# Patient Record
Sex: MALE | Race: BLACK OR AFRICAN AMERICAN | NOT HISPANIC OR LATINO | Employment: STUDENT | URBAN - METROPOLITAN AREA
[De-identification: names, ages, dates, MRNs, and addresses within clinical notes are randomized per-mention and may not be internally consistent; named-entity substitution may affect disease eponyms.]

---
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
## 2023-09-10 ENCOUNTER — HOSPITAL ENCOUNTER (EMERGENCY)
Facility: HOSPITAL | Age: 18
Discharge: HOME | End: 2023-09-10
Attending: FAMILY MEDICINE
Payer: MEDICAID

## 2023-09-10 VITALS
OXYGEN SATURATION: 98 % | HEIGHT: 75 IN | BODY MASS INDEX: 34.82 KG/M2 | TEMPERATURE: 99 F | RESPIRATION RATE: 16 BRPM | WEIGHT: 280 LBS | DIASTOLIC BLOOD PRESSURE: 64 MMHG | HEART RATE: 85 BPM | SYSTOLIC BLOOD PRESSURE: 143 MMHG

## 2023-09-10 DIAGNOSIS — A08.4 VIRAL GASTROENTERITIS: Primary | ICD-10-CM

## 2023-09-10 LAB
ALBUMIN SERPL BCP-MCNC: 4.4 G/DL (ref 3.5–5.2)
ALP SERPL-CCNC: 60 U/L (ref 38–126)
ALT SERPL W/O P-5'-P-CCNC: 58 U/L (ref 10–44)
ANION GAP SERPL CALC-SCNC: 10 MMOL/L (ref 8–16)
AST SERPL-CCNC: 47 U/L (ref 15–46)
BASOPHILS # BLD AUTO: 0.02 K/UL (ref 0–0.2)
BASOPHILS NFR BLD: 0.2 % (ref 0–1.9)
BILIRUB SERPL-MCNC: 0.4 MG/DL (ref 0.1–1)
CALCIUM SERPL-MCNC: 8.8 MG/DL (ref 8.7–10.5)
CHLORIDE SERPL-SCNC: 102 MMOL/L (ref 95–110)
CO2 SERPL-SCNC: 28 MMOL/L (ref 23–29)
CREAT SERPL-MCNC: 1.27 MG/DL (ref 0.5–1.4)
DIFFERENTIAL METHOD: ABNORMAL
EOSINOPHIL # BLD AUTO: 0.1 K/UL (ref 0–0.5)
EOSINOPHIL NFR BLD: 1.2 % (ref 0–8)
ERYTHROCYTE [DISTWIDTH] IN BLOOD BY AUTOMATED COUNT: 11.9 % (ref 11.5–14.5)
EST. GFR  (NO RACE VARIABLE): >60 ML/MIN/1.73 M^2
GLUCOSE SERPL-MCNC: 99 MG/DL (ref 70–110)
HCT VFR BLD AUTO: 46.2 % (ref 40–54)
HGB BLD-MCNC: 15.2 G/DL (ref 14–18)
IMM GRANULOCYTES # BLD AUTO: 0.02 K/UL (ref 0–0.04)
IMM GRANULOCYTES NFR BLD AUTO: 0.2 % (ref 0–0.5)
LYMPHOCYTES # BLD AUTO: 1.4 K/UL (ref 1–4.8)
LYMPHOCYTES NFR BLD: 16.5 % (ref 18–48)
MCH RBC QN AUTO: 27.9 PG (ref 27–31)
MCHC RBC AUTO-ENTMCNC: 32.9 G/DL (ref 32–36)
MCV RBC AUTO: 85 FL (ref 82–98)
MONOCYTES # BLD AUTO: 1 K/UL (ref 0.3–1)
MONOCYTES NFR BLD: 11.6 % (ref 4–15)
NEUTROPHILS # BLD AUTO: 6.1 K/UL (ref 1.8–7.7)
NEUTROPHILS NFR BLD: 70.3 % (ref 38–73)
NRBC BLD-RTO: 0 /100 WBC
PLATELET # BLD AUTO: 207 K/UL (ref 150–450)
PMV BLD AUTO: 11.5 FL (ref 9.2–12.9)
POTASSIUM SERPL-SCNC: 3.8 MMOL/L (ref 3.5–5.1)
PROT SERPL-MCNC: 7.8 G/DL (ref 6–8.4)
RBC # BLD AUTO: 5.44 M/UL (ref 4.6–6.2)
SARS-COV-2 RDRP RESP QL NAA+PROBE: NEGATIVE
SODIUM SERPL-SCNC: 140 MMOL/L (ref 136–145)
UUN UR-MCNC: 16 MG/DL (ref 2–20)
WBC # BLD AUTO: 8.67 K/UL (ref 3.9–12.7)

## 2023-09-10 PROCEDURE — 80053 COMPREHEN METABOLIC PANEL: CPT | Mod: ER | Performed by: FAMILY MEDICINE

## 2023-09-10 PROCEDURE — 25000003 PHARM REV CODE 250: Mod: ER | Performed by: FAMILY MEDICINE

## 2023-09-10 PROCEDURE — U0002 COVID-19 LAB TEST NON-CDC: HCPCS | Mod: ER | Performed by: FAMILY MEDICINE

## 2023-09-10 PROCEDURE — 96374 THER/PROPH/DIAG INJ IV PUSH: CPT | Mod: ER

## 2023-09-10 PROCEDURE — 96361 HYDRATE IV INFUSION ADD-ON: CPT | Mod: ER

## 2023-09-10 PROCEDURE — 63600175 PHARM REV CODE 636 W HCPCS: Mod: ER | Performed by: FAMILY MEDICINE

## 2023-09-10 PROCEDURE — 96375 TX/PRO/DX INJ NEW DRUG ADDON: CPT | Mod: ER

## 2023-09-10 PROCEDURE — 85025 COMPLETE CBC W/AUTO DIFF WBC: CPT | Mod: ER | Performed by: FAMILY MEDICINE

## 2023-09-10 PROCEDURE — 99284 EMERGENCY DEPT VISIT MOD MDM: CPT | Mod: 25,ER

## 2023-09-10 RX ORDER — ONDANSETRON 4 MG/1
4 TABLET, ORALLY DISINTEGRATING ORAL EVERY 8 HOURS PRN
Qty: 20 TABLET | Refills: 0 | Status: SHIPPED | OUTPATIENT
Start: 2023-09-10 | End: 2024-01-30

## 2023-09-10 RX ORDER — FAMOTIDINE 10 MG/ML
20 INJECTION INTRAVENOUS
Status: COMPLETED | OUTPATIENT
Start: 2023-09-10 | End: 2023-09-10

## 2023-09-10 RX ORDER — LOPERAMIDE HYDROCHLORIDE 2 MG/1
4 CAPSULE ORAL
Status: COMPLETED | OUTPATIENT
Start: 2023-09-10 | End: 2023-09-10

## 2023-09-10 RX ORDER — ONDANSETRON 2 MG/ML
4 INJECTION INTRAMUSCULAR; INTRAVENOUS
Status: COMPLETED | OUTPATIENT
Start: 2023-09-10 | End: 2023-09-10

## 2023-09-10 RX ORDER — DICYCLOMINE HYDROCHLORIDE 10 MG/1
20 CAPSULE ORAL
Status: COMPLETED | OUTPATIENT
Start: 2023-09-10 | End: 2023-09-10

## 2023-09-10 RX ORDER — FAMOTIDINE 20 MG/1
20 TABLET, FILM COATED ORAL 2 TIMES DAILY
Qty: 30 TABLET | Refills: 0 | Status: SHIPPED | OUTPATIENT
Start: 2023-09-10 | End: 2024-01-30

## 2023-09-10 RX ORDER — LOPERAMIDE HYDROCHLORIDE 2 MG/1
2 CAPSULE ORAL 4 TIMES DAILY PRN
Qty: 15 CAPSULE | Refills: 0 | Status: SHIPPED | OUTPATIENT
Start: 2023-09-10 | End: 2024-01-30

## 2023-09-10 RX ORDER — KETOROLAC TROMETHAMINE 30 MG/ML
15 INJECTION, SOLUTION INTRAMUSCULAR; INTRAVENOUS
Status: COMPLETED | OUTPATIENT
Start: 2023-09-10 | End: 2023-09-10

## 2023-09-10 RX ORDER — DICYCLOMINE HYDROCHLORIDE 20 MG/1
20 TABLET ORAL 3 TIMES DAILY PRN
Qty: 30 TABLET | Refills: 0 | Status: SHIPPED | OUTPATIENT
Start: 2023-09-10 | End: 2024-01-30

## 2023-09-10 RX ADMIN — LOPERAMIDE HYDROCHLORIDE 4 MG: 2 CAPSULE ORAL at 07:09

## 2023-09-10 RX ADMIN — DICYCLOMINE HYDROCHLORIDE 20 MG: 10 CAPSULE ORAL at 07:09

## 2023-09-10 RX ADMIN — KETOROLAC TROMETHAMINE 15 MG: 30 INJECTION, SOLUTION INTRAMUSCULAR; INTRAVENOUS at 06:09

## 2023-09-10 RX ADMIN — FAMOTIDINE 20 MG: 10 INJECTION, SOLUTION INTRAVENOUS at 06:09

## 2023-09-10 RX ADMIN — SODIUM CHLORIDE 1000 ML: 9 INJECTION, SOLUTION INTRAVENOUS at 06:09

## 2023-09-10 RX ADMIN — ONDANSETRON 4 MG: 2 INJECTION INTRAMUSCULAR; INTRAVENOUS at 06:09

## 2023-09-10 NOTE — Clinical Note
Hayde Vargas accompanied their family member to the emergency department on 9/10/2023. They may return to school on 09/11/2023.      If you have any questions or concerns, please don't hesitate to call.      Zari Carson RN RN

## 2023-09-10 NOTE — ED PROVIDER NOTES
Encounter Date: 9/10/2023       History     Chief Complaint   Patient presents with    Abdominal Pain    Diarrhea     X 4 days     18-year-old male complains of diarrhea for last 4 days.  No blood in stool.  Able to tolerate orally.  Diffuse cramps in belly.  Denies having fever.  No nasal congestion or sore throat.    The history is provided by the patient.     Review of patient's allergies indicates:  No Known Allergies  Past Medical History:   Diagnosis Date    Asthma     Eczema      History reviewed. No pertinent surgical history.  History reviewed. No pertinent family history.  Social History     Tobacco Use    Smoking status: Never    Smokeless tobacco: Never   Substance Use Topics    Alcohol use: Never    Drug use: Never     Review of Systems   Gastrointestinal:  Positive for diarrhea.   All other systems reviewed and are negative.      Physical Exam     Initial Vitals [09/10/23 0616]   BP Pulse Resp Temp SpO2   (!) 157/70 98 20 98.5 °F (36.9 °C) 96 %      MAP       --         Physical Exam    Nursing note and vitals reviewed.  Constitutional: Vital signs are normal. He appears well-developed and well-nourished. He is active. No distress.   HENT:   Head: Normocephalic.   Nose: Nose normal.   Mouth/Throat: Oropharynx is clear and moist and mucous membranes are normal.   Eyes: Conjunctivae, EOM and lids are normal.   Neck: Neck supple.   Normal range of motion.  Cardiovascular:  Normal rate, regular rhythm, S1 normal, S2 normal and normal heart sounds.           Pulmonary/Chest: Breath sounds normal. No respiratory distress. He has no wheezes. He has no rales.   Abdominal: Abdomen is soft. Bowel sounds are normal. He exhibits no distension. There is no abdominal tenderness. There is no rebound.   Musculoskeletal:         General: Normal range of motion.      Right upper arm: Normal.      Left upper arm: Normal.      Cervical back: Normal range of motion and neck supple.      Right lower leg: Normal.      Left  lower leg: Normal.     Neurological: He is alert and oriented to person, place, and time. He has normal strength. GCS score is 15. GCS eye subscore is 4. GCS verbal subscore is 5. GCS motor subscore is 6.   Skin: Skin is warm. Capillary refill takes less than 2 seconds.   Psychiatric: He has a normal mood and affect. His speech is normal and behavior is normal. Thought content normal. Cognition and memory are normal.         ED Course   Procedures  Labs Reviewed   CBC W/ AUTO DIFFERENTIAL - Abnormal; Notable for the following components:       Result Value    Lymph % 16.5 (*)     All other components within normal limits   COMPREHENSIVE METABOLIC PANEL - Abnormal; Notable for the following components:    AST 47 (*)     ALT 58 (*)     All other components within normal limits   SARS-COV-2 RNA AMPLIFICATION, QUAL    Narrative:     Is the patient symptomatic?->Yes   URINALYSIS, REFLEX TO URINE CULTURE          Imaging Results    None          Medications   sodium chloride 0.9% bolus 1,000 mL 1,000 mL (1,000 mLs Intravenous New Bag 9/10/23 0632)   loperamide capsule 4 mg (has no administration in time range)   dicyclomine capsule 20 mg (has no administration in time range)   ondansetron injection 4 mg (4 mg Intravenous Given 9/10/23 0632)   famotidine (PF) injection 20 mg (20 mg Intravenous Given 9/10/23 0633)   ketorolac injection 15 mg (15 mg Intravenous Given 9/10/23 0632)     Medical Decision Making  Diarrhea for last 4 days.  Not taken any medication.  Able to tolerate orally.  Diffuse abdominal cramps.  Abdomen is soft nondistended.  Increased bowel sounds.  Oral mucosa slightly dry.    Differential diagnosis includes:- viral gastroenteritis, bacterial gastroenteritis, food toxin, dehydration, irritable bowel syndrome,    Patient given IV fluids, Zofran, Toradol, Bentyl and Imodium in ED.  Elevated liver enzymes otherwise normal white cell count.  Patient tolerating orally.  Advised for adequate hydration.   Prescribed Zofran, Bentyl and Pepcid.  ED with any worsening symptoms.      Amount and/or Complexity of Data Reviewed  Labs: ordered. Decision-making details documented in ED Course.     Details: Normal white cell count.  BUN and creatinine within normal range.  Slightly elevated AST and ALT.  Probably secondary to viral gastroenteritis.                               Clinical Impression:   Final diagnoses:  [A08.4] Viral gastroenteritis (Primary)        ED Disposition Condition    Discharge Stable          ED Prescriptions       Medication Sig Dispense Start Date End Date Auth. Provider    ondansetron (ZOFRAN-ODT) 4 MG TbDL Take 1 tablet (4 mg total) by mouth every 8 (eight) hours as needed (nausea or vomiting). 20 tablet 9/10/2023 -- Yusuf Heath MD    famotidine (PEPCID) 20 MG tablet Take 1 tablet (20 mg total) by mouth 2 (two) times daily. 30 tablet 9/10/2023 -- Yusuf Heath MD    loperamide (IMODIUM) 2 mg capsule Take 1 capsule (2 mg total) by mouth 4 (four) times daily as needed for Diarrhea. 15 capsule 9/10/2023 -- Yusuf Heath MD    dicyclomine (BENTYL) 20 mg tablet Take 1 tablet (20 mg total) by mouth 3 (three) times daily as needed (abdominal pain). 30 tablet 9/10/2023 -- Yusuf Heath MD          Follow-up Information       Follow up With Specialties Details Why Contact Info    Primarycare physician  In 2 days F/U              Yusuf Heath MD  09/10/23 9244

## 2023-09-10 NOTE — Clinical Note
"Mike Bartoneliud Aguillon was seen and treated in our emergency department on 9/10/2023.  He may return to work on 09/12/2023.  Pt sick since 9/8     If you have any questions or concerns, please don't hesitate to call.      Yusuf Heath MD"

## 2024-01-30 ENCOUNTER — HOSPITAL ENCOUNTER (OUTPATIENT)
Facility: HOSPITAL | Age: 19
Discharge: HOME | End: 2024-01-31
Attending: EMERGENCY MEDICINE | Admitting: INTERNAL MEDICINE
Payer: MEDICAID

## 2024-01-30 DIAGNOSIS — R06.2 WHEEZING: ICD-10-CM

## 2024-01-30 DIAGNOSIS — J45.901 MODERATE ASTHMA WITH EXACERBATION, UNSPECIFIED WHETHER PERSISTENT: Primary | ICD-10-CM

## 2024-01-30 DIAGNOSIS — J45.901 REACTIVE AIRWAY DISEASE WITH ACUTE EXACERBATION, UNSPECIFIED ASTHMA SEVERITY, UNSPECIFIED WHETHER PERSISTENT: ICD-10-CM

## 2024-01-30 DIAGNOSIS — J45.909 CHILDHOOD ASTHMA, UNSPECIFIED ASTHMA SEVERITY, UNSPECIFIED WHETHER COMPLICATED, UNSPECIFIED WHETHER PERSISTENT: ICD-10-CM

## 2024-01-30 DIAGNOSIS — R06.02 SOB (SHORTNESS OF BREATH): ICD-10-CM

## 2024-01-30 DIAGNOSIS — R74.01 TRANSAMINITIS: ICD-10-CM

## 2024-01-30 DIAGNOSIS — L30.9 ECZEMA, UNSPECIFIED TYPE: ICD-10-CM

## 2024-01-30 LAB
ALBUMIN SERPL BCP-MCNC: 4.3 G/DL (ref 3.5–5.2)
ALP SERPL-CCNC: 65 U/L (ref 38–126)
ALT SERPL W/O P-5'-P-CCNC: 61 U/L (ref 10–44)
ANION GAP SERPL CALC-SCNC: 7 MMOL/L (ref 8–16)
AST SERPL-CCNC: 55 U/L (ref 15–46)
BASOPHILS # BLD AUTO: 0.03 K/UL (ref 0–0.2)
BASOPHILS NFR BLD: 0.2 % (ref 0–1.9)
BILIRUB SERPL-MCNC: 0.6 MG/DL (ref 0.1–1)
CALCIUM SERPL-MCNC: 9.6 MG/DL (ref 8.7–10.5)
CHLORIDE SERPL-SCNC: 103 MMOL/L (ref 95–110)
CO2 SERPL-SCNC: 29 MMOL/L (ref 23–29)
CREAT SERPL-MCNC: 1.16 MG/DL (ref 0.5–1.4)
D DIMER PPP IA.FEU-MCNC: 0.36 MG/L FEU
DIFFERENTIAL METHOD BLD: ABNORMAL
EOSINOPHIL # BLD AUTO: 0.2 K/UL (ref 0–0.5)
EOSINOPHIL NFR BLD: 1.8 % (ref 0–8)
ERYTHROCYTE [DISTWIDTH] IN BLOOD BY AUTOMATED COUNT: 11.9 % (ref 11.5–14.5)
EST. GFR  (NO RACE VARIABLE): >60 ML/MIN/1.73 M^2
GLUCOSE SERPL-MCNC: 108 MG/DL (ref 70–110)
HCT VFR BLD AUTO: 43.9 % (ref 40–54)
HGB BLD-MCNC: 14.5 G/DL (ref 14–18)
IMM GRANULOCYTES # BLD AUTO: 0.05 K/UL (ref 0–0.04)
IMM GRANULOCYTES NFR BLD AUTO: 0.4 % (ref 0–0.5)
INFLUENZA A, MOLECULAR: NEGATIVE
INFLUENZA B, MOLECULAR: NEGATIVE
LYMPHOCYTES # BLD AUTO: 2.4 K/UL (ref 1–4.8)
LYMPHOCYTES NFR BLD: 17.9 % (ref 18–48)
MCH RBC QN AUTO: 27.7 PG (ref 27–31)
MCHC RBC AUTO-ENTMCNC: 33 G/DL (ref 32–36)
MCV RBC AUTO: 84 FL (ref 82–98)
MONOCYTES # BLD AUTO: 1.1 K/UL (ref 0.3–1)
MONOCYTES NFR BLD: 8 % (ref 4–15)
NEUTROPHILS # BLD AUTO: 9.7 K/UL (ref 1.8–7.7)
NEUTROPHILS NFR BLD: 71.7 % (ref 38–73)
NRBC BLD-RTO: 0 /100 WBC
NT-PROBNP SERPL-MCNC: <20 PG/ML (ref 5–450)
PLATELET # BLD AUTO: 213 K/UL (ref 150–450)
PMV BLD AUTO: 11.2 FL (ref 9.2–12.9)
POTASSIUM SERPL-SCNC: 3.9 MMOL/L (ref 3.5–5.1)
PROT SERPL-MCNC: 7.7 G/DL (ref 6–8.4)
RBC # BLD AUTO: 5.24 M/UL (ref 4.6–6.2)
SARS-COV-2 RDRP RESP QL NAA+PROBE: NEGATIVE
SODIUM SERPL-SCNC: 139 MMOL/L (ref 136–145)
SPECIMEN SOURCE: NORMAL
TROPONIN I SERPL-MCNC: 0.01 NG/ML (ref 0.01–0.03)
UUN UR-MCNC: 18 MG/DL (ref 2–20)
WBC # BLD AUTO: 13.54 K/UL (ref 3.9–12.7)

## 2024-01-30 PROCEDURE — 94640 AIRWAY INHALATION TREATMENT: CPT | Mod: ER,XB

## 2024-01-30 PROCEDURE — 80053 COMPREHEN METABOLIC PANEL: CPT | Mod: ER | Performed by: NURSE PRACTITIONER

## 2024-01-30 PROCEDURE — 25000003 PHARM REV CODE 250: Mod: ER | Performed by: NURSE PRACTITIONER

## 2024-01-30 PROCEDURE — 99900035 HC TECH TIME PER 15 MIN (STAT): Mod: ER

## 2024-01-30 PROCEDURE — 87502 INFLUENZA DNA AMP PROBE: CPT | Mod: ER

## 2024-01-30 PROCEDURE — G0378 HOSPITAL OBSERVATION PER HR: HCPCS | Mod: ER

## 2024-01-30 PROCEDURE — 93005 ELECTROCARDIOGRAM TRACING: CPT | Mod: ER

## 2024-01-30 PROCEDURE — 85025 COMPLETE CBC W/AUTO DIFF WBC: CPT | Mod: ER | Performed by: NURSE PRACTITIONER

## 2024-01-30 PROCEDURE — 93010 ELECTROCARDIOGRAM REPORT: CPT | Mod: ,,, | Performed by: INTERNAL MEDICINE

## 2024-01-30 PROCEDURE — 63600175 PHARM REV CODE 636 W HCPCS: Mod: ER | Performed by: NURSE PRACTITIONER

## 2024-01-30 PROCEDURE — 96374 THER/PROPH/DIAG INJ IV PUSH: CPT | Mod: ER

## 2024-01-30 PROCEDURE — U0002 COVID-19 LAB TEST NON-CDC: HCPCS | Mod: ER | Performed by: NURSE PRACTITIONER

## 2024-01-30 PROCEDURE — 87502 INFLUENZA DNA AMP PROBE: CPT | Mod: ER | Performed by: NURSE PRACTITIONER

## 2024-01-30 PROCEDURE — 84484 ASSAY OF TROPONIN QUANT: CPT | Mod: ER | Performed by: NURSE PRACTITIONER

## 2024-01-30 PROCEDURE — 25000242 PHARM REV CODE 250 ALT 637 W/ HCPCS: Mod: ER | Performed by: NURSE PRACTITIONER

## 2024-01-30 PROCEDURE — 83880 ASSAY OF NATRIURETIC PEPTIDE: CPT | Mod: ER | Performed by: NURSE PRACTITIONER

## 2024-01-30 PROCEDURE — 99285 EMERGENCY DEPT VISIT HI MDM: CPT | Mod: 25,ER

## 2024-01-30 PROCEDURE — 27000221 HC OXYGEN, UP TO 24 HOURS: Mod: ER

## 2024-01-30 PROCEDURE — 85379 FIBRIN DEGRADATION QUANT: CPT | Mod: ER | Performed by: NURSE PRACTITIONER

## 2024-01-30 RX ORDER — PREDNISONE 20 MG/1
40 TABLET ORAL NIGHTLY
Status: DISCONTINUED | OUTPATIENT
Start: 2024-01-31 | End: 2024-01-31 | Stop reason: HOSPADM

## 2024-01-30 RX ORDER — TALC
6 POWDER (GRAM) TOPICAL NIGHTLY PRN
Status: DISCONTINUED | OUTPATIENT
Start: 2024-01-31 | End: 2024-01-31 | Stop reason: HOSPADM

## 2024-01-30 RX ORDER — POLYETHYLENE GLYCOL 3350 17 G/17G
17 POWDER, FOR SOLUTION ORAL DAILY
Status: DISCONTINUED | OUTPATIENT
Start: 2024-01-31 | End: 2024-01-31 | Stop reason: HOSPADM

## 2024-01-30 RX ORDER — ENOXAPARIN SODIUM 100 MG/ML
40 INJECTION SUBCUTANEOUS EVERY 24 HOURS
Status: DISCONTINUED | OUTPATIENT
Start: 2024-01-31 | End: 2024-01-31 | Stop reason: HOSPADM

## 2024-01-30 RX ORDER — SODIUM CHLORIDE 0.9 % (FLUSH) 0.9 %
10 SYRINGE (ML) INJECTION EVERY 12 HOURS PRN
Status: DISCONTINUED | OUTPATIENT
Start: 2024-01-31 | End: 2024-01-31 | Stop reason: HOSPADM

## 2024-01-30 RX ORDER — IPRATROPIUM BROMIDE AND ALBUTEROL SULFATE 2.5; .5 MG/3ML; MG/3ML
3 SOLUTION RESPIRATORY (INHALATION) EVERY 4 HOURS
Status: DISCONTINUED | OUTPATIENT
Start: 2024-01-30 | End: 2024-01-31

## 2024-01-30 RX ORDER — IBUPROFEN 200 MG
24 TABLET ORAL
Status: DISCONTINUED | OUTPATIENT
Start: 2024-01-31 | End: 2024-01-31 | Stop reason: SDUPTHER

## 2024-01-30 RX ORDER — PREDNISONE 20 MG/1
60 TABLET ORAL
Status: COMPLETED | OUTPATIENT
Start: 2024-01-30 | End: 2024-01-30

## 2024-01-30 RX ORDER — IBUPROFEN 200 MG
16 TABLET ORAL
Status: DISCONTINUED | OUTPATIENT
Start: 2024-01-31 | End: 2024-01-31 | Stop reason: SDUPTHER

## 2024-01-30 RX ORDER — NALOXONE HCL 0.4 MG/ML
0.02 VIAL (ML) INJECTION
Status: DISCONTINUED | OUTPATIENT
Start: 2024-01-31 | End: 2024-01-31 | Stop reason: HOSPADM

## 2024-01-30 RX ORDER — IPRATROPIUM BROMIDE AND ALBUTEROL SULFATE 2.5; .5 MG/3ML; MG/3ML
3 SOLUTION RESPIRATORY (INHALATION) EVERY 4 HOURS
Status: DISCONTINUED | OUTPATIENT
Start: 2024-01-30 | End: 2024-01-30

## 2024-01-30 RX ORDER — ACETAMINOPHEN 500 MG
1000 TABLET ORAL
Status: COMPLETED | OUTPATIENT
Start: 2024-01-30 | End: 2024-01-30

## 2024-01-30 RX ORDER — IPRATROPIUM BROMIDE AND ALBUTEROL SULFATE 2.5; .5 MG/3ML; MG/3ML
3 SOLUTION RESPIRATORY (INHALATION) EVERY 4 HOURS
Status: DISCONTINUED | OUTPATIENT
Start: 2024-01-31 | End: 2024-01-30

## 2024-01-30 RX ORDER — DEXAMETHASONE SODIUM PHOSPHATE 4 MG/ML
8 INJECTION, SOLUTION INTRA-ARTICULAR; INTRALESIONAL; INTRAMUSCULAR; INTRAVENOUS; SOFT TISSUE
Status: COMPLETED | OUTPATIENT
Start: 2024-01-30 | End: 2024-01-30

## 2024-01-30 RX ORDER — GLUCAGON 1 MG
1 KIT INJECTION
Status: DISCONTINUED | OUTPATIENT
Start: 2024-01-31 | End: 2024-01-31 | Stop reason: SDUPTHER

## 2024-01-30 RX ORDER — LEVALBUTEROL 1.25 MG/.5ML
1.25 SOLUTION, CONCENTRATE RESPIRATORY (INHALATION)
Status: DISPENSED | OUTPATIENT
Start: 2024-01-30 | End: 2024-01-30

## 2024-01-30 RX ORDER — IPRATROPIUM BROMIDE AND ALBUTEROL SULFATE 2.5; .5 MG/3ML; MG/3ML
3 SOLUTION RESPIRATORY (INHALATION)
Status: COMPLETED | OUTPATIENT
Start: 2024-01-30 | End: 2024-01-30

## 2024-01-30 RX ADMIN — IPRATROPIUM BROMIDE AND ALBUTEROL SULFATE 3 ML: .5; 3 SOLUTION RESPIRATORY (INHALATION) at 05:01

## 2024-01-30 RX ADMIN — DEXAMETHASONE SODIUM PHOSPHATE 8 MG: 4 INJECTION, SOLUTION INTRA-ARTICULAR; INTRALESIONAL; INTRAMUSCULAR; INTRAVENOUS; SOFT TISSUE at 06:01

## 2024-01-30 RX ADMIN — ACETAMINOPHEN 1000 MG: 500 TABLET ORAL at 04:01

## 2024-01-30 RX ADMIN — LEVALBUTEROL 1.25 MG: 1.25 SOLUTION, CONCENTRATE RESPIRATORY (INHALATION) at 04:01

## 2024-01-30 RX ADMIN — PREDNISONE 60 MG: 20 TABLET ORAL at 04:01

## 2024-01-30 RX ADMIN — IPRATROPIUM BROMIDE AND ALBUTEROL SULFATE 3 ML: .5; 3 SOLUTION RESPIRATORY (INHALATION) at 08:01

## 2024-01-30 NOTE — Clinical Note
Diagnosis: Moderate asthma with exacerbation, unspecified whether persistent [[id number]]   Future Attending Provider: MIKE LEAVITT [[id number]]   Is the patient being sent to ED Observation?: No

## 2024-01-31 ENCOUNTER — TELEPHONE (OUTPATIENT)
Dept: PULMONOLOGY | Facility: CLINIC | Age: 19
End: 2024-01-31
Payer: MEDICAID

## 2024-01-31 VITALS
DIASTOLIC BLOOD PRESSURE: 70 MMHG | WEIGHT: 280 LBS | RESPIRATION RATE: 18 BRPM | OXYGEN SATURATION: 96 % | SYSTOLIC BLOOD PRESSURE: 136 MMHG | HEIGHT: 75 IN | TEMPERATURE: 98 F | BODY MASS INDEX: 34.82 KG/M2 | HEART RATE: 105 BPM

## 2024-01-31 DIAGNOSIS — J45.909 ASTHMA, UNSPECIFIED ASTHMA SEVERITY, UNSPECIFIED WHETHER COMPLICATED, UNSPECIFIED WHETHER PERSISTENT: Primary | ICD-10-CM

## 2024-01-31 PROBLEM — L30.9 ECZEMA: Status: ACTIVE | Noted: 2024-01-31

## 2024-01-31 PROBLEM — R73.9 HYPERGLYCEMIA: Status: ACTIVE | Noted: 2024-01-31

## 2024-01-31 PROBLEM — J45.901 MODERATE ASTHMA WITH EXACERBATION: Status: RESOLVED | Noted: 2024-01-30 | Resolved: 2024-01-31

## 2024-01-31 PROBLEM — R74.01 TRANSAMINITIS: Status: ACTIVE | Noted: 2024-01-31

## 2024-01-31 PROBLEM — J45.901 REACTIVE AIRWAY DISEASE WITH ACUTE EXACERBATION: Status: ACTIVE | Noted: 2024-01-31

## 2024-01-31 LAB
ALBUMIN SERPL BCP-MCNC: 3.8 G/DL (ref 3.5–5.2)
ALP SERPL-CCNC: 61 U/L (ref 55–135)
ALT SERPL W/O P-5'-P-CCNC: 49 U/L (ref 10–44)
ANION GAP SERPL CALC-SCNC: 11 MMOL/L (ref 8–16)
AST SERPL-CCNC: 36 U/L (ref 10–40)
BASOPHILS # BLD AUTO: 0.01 K/UL (ref 0–0.2)
BASOPHILS NFR BLD: 0.1 % (ref 0–1.9)
BILIRUB SERPL-MCNC: 0.6 MG/DL (ref 0.1–1)
BUN SERPL-MCNC: 19 MG/DL (ref 6–20)
CALCIUM SERPL-MCNC: 10.1 MG/DL (ref 8.7–10.5)
CHLORIDE SERPL-SCNC: 104 MMOL/L (ref 95–110)
CO2 SERPL-SCNC: 23 MMOL/L (ref 23–29)
CREAT SERPL-MCNC: 1.3 MG/DL (ref 0.5–1.4)
DIFFERENTIAL METHOD BLD: ABNORMAL
EOSINOPHIL # BLD AUTO: 0 K/UL (ref 0–0.5)
EOSINOPHIL NFR BLD: 0.1 % (ref 0–8)
ERYTHROCYTE [DISTWIDTH] IN BLOOD BY AUTOMATED COUNT: 12.3 % (ref 11.5–14.5)
EST. GFR  (NO RACE VARIABLE): >60 ML/MIN/1.73 M^2
ESTIMATED AVG GLUCOSE: 123 MG/DL (ref 68–131)
GLUCOSE SERPL-MCNC: 217 MG/DL (ref 70–110)
HBA1C MFR BLD: 5.9 % (ref 4–5.6)
HCT VFR BLD AUTO: 42.8 % (ref 40–54)
HGB BLD-MCNC: 14.2 G/DL (ref 14–18)
IMM GRANULOCYTES # BLD AUTO: 0.05 K/UL (ref 0–0.04)
IMM GRANULOCYTES NFR BLD AUTO: 0.5 % (ref 0–0.5)
LYMPHOCYTES # BLD AUTO: 1.5 K/UL (ref 1–4.8)
LYMPHOCYTES NFR BLD: 15.4 % (ref 18–48)
MCH RBC QN AUTO: 27.6 PG (ref 27–31)
MCHC RBC AUTO-ENTMCNC: 33.2 G/DL (ref 32–36)
MCV RBC AUTO: 83 FL (ref 82–98)
MONOCYTES # BLD AUTO: 0.4 K/UL (ref 0.3–1)
MONOCYTES NFR BLD: 4.5 % (ref 4–15)
NEUTROPHILS # BLD AUTO: 7.8 K/UL (ref 1.8–7.7)
NEUTROPHILS NFR BLD: 79.4 % (ref 38–73)
NRBC BLD-RTO: 0 /100 WBC
PLATELET # BLD AUTO: 229 K/UL (ref 150–450)
PMV BLD AUTO: 12.4 FL (ref 9.2–12.9)
POCT GLUCOSE: 132 MG/DL (ref 70–110)
POCT GLUCOSE: 177 MG/DL (ref 70–110)
POCT GLUCOSE: 179 MG/DL (ref 70–110)
POCT GLUCOSE: 203 MG/DL (ref 70–110)
POTASSIUM SERPL-SCNC: 4.6 MMOL/L (ref 3.5–5.1)
PROT SERPL-MCNC: 7.4 G/DL (ref 6–8.4)
RBC # BLD AUTO: 5.14 M/UL (ref 4.6–6.2)
SODIUM SERPL-SCNC: 138 MMOL/L (ref 136–145)
WBC # BLD AUTO: 9.85 K/UL (ref 3.9–12.7)

## 2024-01-31 PROCEDURE — 25000003 PHARM REV CODE 250

## 2024-01-31 PROCEDURE — 85025 COMPLETE CBC W/AUTO DIFF WBC: CPT

## 2024-01-31 PROCEDURE — 94761 N-INVAS EAR/PLS OXIMETRY MLT: CPT

## 2024-01-31 PROCEDURE — 80053 COMPREHEN METABOLIC PANEL: CPT

## 2024-01-31 PROCEDURE — G0378 HOSPITAL OBSERVATION PER HR: HCPCS

## 2024-01-31 PROCEDURE — 36415 COLL VENOUS BLD VENIPUNCTURE: CPT

## 2024-01-31 PROCEDURE — 94640 AIRWAY INHALATION TREATMENT: CPT | Mod: XB

## 2024-01-31 PROCEDURE — 83036 HEMOGLOBIN GLYCOSYLATED A1C: CPT

## 2024-01-31 PROCEDURE — 25000242 PHARM REV CODE 250 ALT 637 W/ HCPCS

## 2024-01-31 RX ORDER — IBUPROFEN 200 MG
16 TABLET ORAL
Status: DISCONTINUED | OUTPATIENT
Start: 2024-01-31 | End: 2024-01-31 | Stop reason: HOSPADM

## 2024-01-31 RX ORDER — ALBUTEROL SULFATE 90 UG/1
2 AEROSOL, METERED RESPIRATORY (INHALATION) EVERY 4 HOURS PRN
Qty: 18 G | Refills: 0 | Status: SHIPPED | OUTPATIENT
Start: 2024-01-31

## 2024-01-31 RX ORDER — IPRATROPIUM BROMIDE AND ALBUTEROL SULFATE 2.5; .5 MG/3ML; MG/3ML
3 SOLUTION RESPIRATORY (INHALATION) EVERY 6 HOURS
Status: DISCONTINUED | OUTPATIENT
Start: 2024-01-31 | End: 2024-01-31 | Stop reason: HOSPADM

## 2024-01-31 RX ORDER — GLUCAGON 1 MG
1 KIT INJECTION
Status: DISCONTINUED | OUTPATIENT
Start: 2024-01-31 | End: 2024-01-31 | Stop reason: HOSPADM

## 2024-01-31 RX ORDER — PREDNISONE 50 MG/1
50 TABLET ORAL DAILY
Qty: 4 TABLET | Refills: 0 | Status: SHIPPED | OUTPATIENT
Start: 2024-01-31 | End: 2024-02-04

## 2024-01-31 RX ORDER — IBUPROFEN 200 MG
24 TABLET ORAL
Status: DISCONTINUED | OUTPATIENT
Start: 2024-01-31 | End: 2024-01-31 | Stop reason: HOSPADM

## 2024-01-31 RX ORDER — CETIRIZINE HYDROCHLORIDE 5 MG/1
5 TABLET ORAL DAILY
Status: DISCONTINUED | OUTPATIENT
Start: 2024-01-31 | End: 2024-01-31 | Stop reason: HOSPADM

## 2024-01-31 RX ORDER — FLUTICASONE PROPIONATE 50 MCG
1 SPRAY, SUSPENSION (ML) NASAL DAILY
Qty: 16 G | Refills: 0 | Status: SHIPPED | OUTPATIENT
Start: 2024-01-31 | End: 2024-03-01

## 2024-01-31 RX ORDER — BENZONATATE 100 MG/1
100 CAPSULE ORAL 3 TIMES DAILY PRN
Status: DISCONTINUED | OUTPATIENT
Start: 2024-01-31 | End: 2024-01-31 | Stop reason: HOSPADM

## 2024-01-31 RX ORDER — INSULIN ASPART 100 [IU]/ML
0-5 INJECTION, SOLUTION INTRAVENOUS; SUBCUTANEOUS
Status: DISCONTINUED | OUTPATIENT
Start: 2024-01-31 | End: 2024-01-31 | Stop reason: HOSPADM

## 2024-01-31 RX ADMIN — IPRATROPIUM BROMIDE AND ALBUTEROL SULFATE 3 ML: .5; 3 SOLUTION RESPIRATORY (INHALATION) at 01:01

## 2024-01-31 RX ADMIN — IPRATROPIUM BROMIDE AND ALBUTEROL SULFATE 3 ML: .5; 3 SOLUTION RESPIRATORY (INHALATION) at 07:01

## 2024-01-31 RX ADMIN — CETIRIZINE HYDROCHLORIDE 5 MG: 5 TABLET, FILM COATED ORAL at 06:01

## 2024-01-31 NOTE — PLAN OF CARE
SW met with pt and his girlfriend at bedside to complete DCA this AM. Pt reported 0/10 pain and was in a pleasant mood throughout assessment. Pt stated that he lives at home with his girlfriend and will have her help transport him home at time of d/c. Pt does not use any HME and is fully independent in his ADLs. Pt did not have any additional questions or concerns for sw at this time. White board updated with CM name and contact information.  Discharge brochure provided.  Pt encouraged to call with any questions or concerns.  Cm will continue to follow pt through transitions of care and assist with any discharge needs.    BILL Wright  [phone]    No future appointments.     01/31/24 1032   Discharge Planning   Assessment Type Discharge Planning Brief Assessment   Support Systems Spouse/significant other   Equipment Currently Used at Home none   Current Living Arrangements home   Care Facility Name home   Patient/Family Anticipates Transition to home   Patient/Family Anticipated Services at Transition none   DME Needed Upon Discharge  none   Discharge Plan A Home with family

## 2024-01-31 NOTE — NURSING
Discharge orders noted. Additional clinical references attached. Patient's discharge instructions given by bedside RN and reviewed by this VN with patient. Education provided on home care instructions, new and previous medications, diagnosis, when to seek medical attention, and follow-up appointments. New medications delivered by pharmacy. Patient verbalized understanding. Patient's family to provide ride/transportation home. All questions answered. Transport to [facility] requested. Floor nurse notified.

## 2024-01-31 NOTE — PROGRESS NOTES
01/31/24 0040   Admission   Initial VN Admission Questions Complete   Shift   Virtual Nurse - Patient Verbalized Approval Of Camera Use;VN Rounding   Safety/Activity   Patient Rounds bed in low position;call light in patient/parent reach;clutter free environment maintained;visualized patient;placement of personal items at bedside   Safety Promotion/Fall Prevention assistive device/personal item within reach;side rails raised x 2;family to remain at bedside  (girlfriend at bedside)   Positioning   Body Position supine   Head of Bed (HOB) Positioning HOB at 30-45 degrees     VN cued in to pt's room with permission. Admission questions completed. Plan of care reviewed with pt. Pt denies any questions or concerns or needs at this time. Call bell w/in reach. Instructed to call for needs/assist.

## 2024-01-31 NOTE — PLAN OF CARE
Pt is set to d/c home later today. Pt will have his girlfriend help transport him home today. SW requested f/u appts. White board updated with CM name and contact information.  Discharge brochure provided.  Pt encouraged to call with any questions or concerns.  Cm will continue to follow pt through transitions of care and assist with any discharge needs.    Marco Antonio Gann [email or web address]  [phone]    Future Appointments   Date Time Provider Department Center   2/9/2024  2:00 PM Yovany Mejia MD [facility]        01/31/24 1344   Final Note   Assessment Type Final Discharge Note   Anticipated Discharge Disposition Home   What phone number can be called within the next 1-3 days to see how you are doing after discharge? [phone]   Post-Acute Status   Discharge Delays None known at this time

## 2024-01-31 NOTE — H&P
[facility] Medicine H&P Note     Admitting Team: [facility] Hospitalist Team A  Attending Physician: Kesha Abdul MD  Resident: Anurag  Intern: Jimbo    Date of Admit: 1/30/2024    Chief Complaint     Shortness of breath x 2 days    Subjective:      History of Present Illness:  Mike Aguillon is a 18 y.o. male with past medical history of childhood asthma who presented to [facility] ED on 1/30/2024 for acute onset shortness of breath, congestion, and fever/chills in the setting of grain exposure at work. He was transferred to [facility] for inpatient management of suspected asthma exacerbation. Patient is admitted to [facility].      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 He reports he was not given a respirator on his first day, as he should have been, and within 4 hours of working, he began to have a runny nose and cough. By the time patient returned home that evening, he reports significant shortness of breath and difficulty breathing. He also reports subjective fever/chills, general malaise and myalgias. He returned to work the next day but after several hours of working his symptoms became worse and he went to the ED on request of his employers.     Patient reports a childhood history of asthma, for which he used PRN albuterol, however reports he has never had an exacerbation requiring prednisone, has never been hospitalized/intubated, and has not used an inhaler in years. He also states he has eczema, primarily affecting his axilla, which he feels is triggered by certain deodorants. He denies history of seasonal allergies or antihistamine use.      Review of Systems:  A comprehensive review of systems was negative unless otherwise stated in the HPI.     Past Medical History:  Past Medical History:   Diagnosis Date    Asthma     Eczema        Past Surgical  History:  Right wrist orthopedic surgery     Allergies:  Review of patient's allergies indicates:  No Known Allergies    Home Medications:  Medications reconciled with patient. No home medications      Family History:  Mother: HTN, allergies  Father: ESRD required HD, now   Siblings: none    History reviewed. No pertinent family history.    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  Influenza:   Not UTD  COVID-19:   Not UTD  Pneumonia:   UTD  TDap:    Pt unsure  Shingles:   Not indicated    Cancer Screening:  Colonoscopy:   routine screening not yet indicated     Objective     ED Vitals: T 98.5F, , /74, RR 26, O2 95% on RA, BMI 35    ED Intervention: Prednisone 60mg PO, IV decadron 8mg, duo nebs x 4, levalbuterol x3, acetaminophen 1000mg     Physical Examination:  Temp:  [97.5 °F (36.4 °C)-99.3 °F (37.4 °C)] 98 °F (36.7 °C)  Pulse:  [] 91  Resp:  [16-28] 17  SpO2:  [92 %-100 %] 94 %  BP: (116-154)/() 140/64  General: No acute distress, resting comfortably   HEENT: Atraumatic, normocephalic, moist mucous membranes  Cardiovascular: Regular rate and rhythm, normal S1 and S2, no extra heart sounds or murmurs appreciated   Chest wall: Non-tender to palpation, no gross deformities noted   Back: Non-tender to palpation, no abnormal curvature noted, symmetric rise with respiration   Respiratory: Stable on room air, normal work of breathing, no conversational dyspnea, no accessory muscle use noted, clear to auscultation bilaterally, no wheezes or crackles   Abdominal: Non-distended, soft, normoactive bowel sounds, non-tender to palpation, no guarding  Extremities: Atraumatic,  non-edematous, moving all four extremities  Pulses: 2+ and symmetric radial artery, posterior tibial artery, dorsalis pedis artery  Skin: Non-diaphoretic, warm, dry  Neurologic: No gross focal neurologic deficits noted      Laboratory:  Recent Labs   Lab 01/30/24  1744 01/31/24  0440   WBC 13.54* 9.85   HGB 14.5 14.2    229   MCV 84 83    138   K 3.9 4.6    104   CO2 29 23   BUN 18 19   CREATININE 1.16 1.3    217*   CALCIUM 9.6 10.1   PROT 7.7 7.4   ALBUMIN 4.3 3.8   AST 55* 36   ALT 61* 49*   ALKPHOS 65 61   TROPONINI 0.013  --            All laboratory data reviewed    Microbiology Data: Reviewed  COVID19 negative  Influenza A/B negative    EKG Data: Reviewed      Radiology Data: Reviewed  CXR: No consolidations or effusions.     Assessment/Plan     Mike Aguillon is a 18 y.o. male with past medical history of childhood asthma who presented to [facility] ED on 1/30/2024 for acute onset shortness of breath, congestion, and fever/chills in the setting of grain exposure at work. He was transferred to [facility] for inpatient management of suspected asthma exacerbation vs allergen induced RAD. Patient is admitted to [facility].     Acute Onset Shortness of Breath   Pt w childhood hx of asthma/eczema w acute onset SOB following grain exposure. Assoc sx include congestion, fever/chills  - Hemodynamically stable, with good airway protection and air movement  - No wheezing on my exam however reportedly audibly wheezing in the ED  - CXR without consolidations or effusions  - s/p duonebs, decadron 8mg, prednisone 60mg in the ED  - Continue duonebs Q6h  - Continue prednisone 40mg for 5 days d/t concern for component of reactive airway disease/asthma  - Start cetirizine for possible allergic component  - Recommend referral to allergy/immunology on discharge    Hyperglycemia  - Could be secondary to prednisone administration, however will obtain A1c given patient BMI 35    Childhood Hx Asthma  - No  longer uses inhalers, previously used PRN albuterol  - Denies history of hospitalizations or intubation  - As above    Eczema  - No active lesions on exam  - Moisture/barrier cream PRN    Healthcare maintenance   -primary care provider is No, Primary Doctor     Diet: Regular  VTE PPx: lovenox  Code Status: Full    Dispo: Admit to observation for RT monitoring  Estimated LOS: 24-48 hours        Latasha Florence MD  [facility]

## 2024-01-31 NOTE — PHARMACY MED REC
"[facility]           Pharmacy  Admission Medication History     The home medication history was taken by Gricelda Rutherford.      Medication history obtained from Medications listed below were obtained from: Patient/family.Patient not taking any daily medications    Based on information gathered for medication list, you may go to "Admission" then "Reconcile Home Medications" tabs to review and/or act upon those items.     The home medication list has been updated by the Pharmacy department.   Please read ALL comments highlighted in yellow.   Please address this information as you see fit.    Feel free to contact us if you have any questions or require assistance.    The medications listed below were removed from the home medication list.  Please reorder if appropriate:    Patient reports NOT TAKING the following medication(s):  Colace 100mg  Imodium 2mg  Prilosec 20mg      No current facility-administered medications on file prior to encounter.     Current Outpatient Medications on File Prior to Encounter   Medication Sig Dispense Refill    dicyclomine (BENTYL) 20 mg tablet Take 1 tablet (20 mg total) by mouth 3 (three) times daily as needed (abdominal pain). (Patient not taking: Reported on 1/30/2024) 30 tablet 0    famotidine (PEPCID) 20 MG tablet Take 1 tablet (20 mg total) by mouth 2 (two) times daily. (Patient not taking: Reported on 1/30/2024) 30 tablet 0    ondansetron (ZOFRAN-ODT) 4 MG TbDL Take 1 tablet (4 mg total) by mouth every 8 (eight) hours as needed (nausea or vomiting). (Patient not taking: Reported on 1/30/2024) 20 tablet 0       Please address this information as you see fit.  Feel free to contact us if you have any questions or require assistance.    Grcielda Rutherford  [phone]                  .          "

## 2024-01-31 NOTE — PROGRESS NOTES
"[facility] IM Resident HO-1 Progress Note    Subjective:     Overnight, patient had drastic improvement to respiratory status. Normal work of breathing, no accessory muscle use. 97% on RA. Lungs CTA bilaterally. Denies any chest pain, shortness of breath, nausea, vomiting, diarrhea, difficulty breathing/swallowing. C/o of some mild nasal congestion.     Given drastic improvement, patient going home today with outpatient referral, steroids, flonase and albuterol inhaler.    Objective:   Last 24 Hour Vital Signs:  BP  Min: 116/85  Max: 145/70  Temp  Av.9 °F (36.6 °C)  Min: 97.5 °F (36.4 °C)  Max: 98.2 °F (36.8 °C)  Pulse  Av.5  Min: 90  Max: 124  Resp  Av.9  Min: 16  Max: 24  SpO2  Av.2 %  Min: 92 %  Max: 100 %  Height  Av' 3" (190.5 cm)  Min: 6' 3" (190.5 cm)  Max: 6' 3" (190.5 cm)  No intake/output data recorded.    Physical Examination:  General: No acute distress, resting comfortably   HEENT: Atraumatic, normocephalic, moist mucous membranes  Cardiovascular: Regular rate and rhythm, normal S1 and S2, no extra heart sounds or murmurs appreciated   Chest wall: Non-tender to palpation, no gross deformities noted   Back: no abnormal curvature noted, symmetric rise with respiration   Respiratory: Stable on room air, normal work of breathing, no conversational dyspnea, no accessory muscle use noted, clear to auscultation bilaterally, no wheezes or crackles   Abdominal: Non-distended, soft, normoactive bowel sounds, non-tender to palpation, no guarding  Extremities: Atraumatic, non-edematous, moving all four extremities  Pulses: 2+ and symmetric radial artery  Skin: Non-diaphoretic, warm, dry  Neurologic: No gross focal neurologic deficits noted      Laboratory:  Laboratory Data Reviewed: yes  Pertinent Findings:  Trended Lab Data:  Recent Labs   Lab 24  1744 24  0440   WBC 13.54* 9.85   HGB 14.5 14.2   HCT 43.9 42.8    229   MCV 84 83   RDW 11.9 12.3    138   K 3.9 4.6    " 104   CO2 29 23   BUN 18 19   CREATININE 1.16 1.3    217*   PROT 7.7 7.4   ALBUMIN 4.3 3.8   BILITOT 0.6 0.6   AST 55* 36   ALKPHOS 65 61   ALT 61* 49*       Trended Cardiac Data:  Recent Labs   Lab 01/30/24  1744   TROPONINI 0.013       Microbiology Data Reviewed: yes  Pertinent Findings:  Flu/COVID negative     Other Results:  EKG (my interpretation):none new   Radiology Data Reviewed: yes  Pertinent Findings:    Current Medications:     Infusions:       Scheduled:   albuterol-ipratropium  3 mL Nebulization Q6H    cetirizine  5 mg Oral Daily    enoxparin  40 mg Subcutaneous Daily    polyethylene glycol  17 g Oral Daily    predniSONE  40 mg Oral QHS        PRN:  benzonatate, dextrose 10%, dextrose 10%, glucagon (human recombinant), glucose, glucose, insulin aspart U-100, melatonin, naloxone, sodium chloride 0.9%    Antibiotics and Day Number of Therapy:      Assessment:     Mike Aguillon is a 18 y.o. male with past medical history of childhood asthma who presented to [facility] ED on 1/30/2024 for acute onset shortness of breath, congestion, and fever/chills in the setting of grain exposure at work. He was transferred to [facility] for inpatient management of suspected asthma exacerbation vs allergen induced RAD. Patient is admitted to [facility].     Plan:     Acute Onset Shortness of Breath   Pt w childhood hx of asthma/eczema w acute onset SOB following grain exposure. Assoc sx include congestion, fever/chills. Hemodynamically stable, with good airway protection and air movement  - No wheezing on my exam however reportedly audibly wheezing in the ED  - CXR without consolidations or effusions  - Flu COVID negative  - s/p duonebs, decadron 8mg, prednisone 60mg, levalbuterol and duonebs in the ED  - Continue duonebs Q6h  - Continue prednisone 40mg for component of reactive airway disease/asthma, will discharge with steroid taper x 5 days total   - Start cetirizine for possible allergic component  - ambulatory  referral to pulmonology and allergy/immunology  - steroid taper, flonase and albuterol rescue inhaler to be delivered at bedside prior to discharge      Hyperglycemia  - Could be secondary to prednisone administration  -A1c 5.9, prediabetes, to be followed up with PCP     Childhood Hx Asthma  - No longer uses inhalers, previously used PRN albuterol  - Denies history of hospitalizations or intubation  - As above     Eczema  - No active lesions on exam  - Moisture/barrier cream PRN     Healthcare maintenance   -primary care provider is   -ambulatory referral to IM       Diet: Regular  VTE PPx: lovenox  Code Status: Full    Bushra Choi DO  [facility] HO-I  [facility] IM Service Team A    [facility] Medicine Hospitalist Pager numbers:   [facility] Hospitalist Medicine Team A (Rafa/Chantell): [phone]  [facility] Hospitalist Medicine Team B (Edy/Cyril):  [phone]

## 2024-01-31 NOTE — ED PROVIDER NOTES
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 He states he attempted to go to work today and the SOB worsens so they sent him to an occupational health clinic which advised he come to the ED. Denies cardiac history. NO other complaints at this time.     The history is provided by the patient.     Review of patient's allergies indicates:  No Known Allergies  Past Medical History:   Diagnosis Date    Asthma     Eczema      History reviewed. No pertinent surgical history.  History reviewed. No pertinent family history.  Social History     Tobacco Use    Smoking status: Never    Smokeless tobacco: Never   Substance Use Topics    Alcohol use: Never    Drug use: Never     Review of Systems   Constitutional:  Negative for fever.   HENT:  Negative for sore throat.    Respiratory:  Positive for cough, shortness of breath and wheezing.    Cardiovascular:  Positive for chest pain.   Gastrointestinal:  Negative for nausea.   Genitourinary:  Negative for dysuria.   Musculoskeletal:  Negative for back pain.   Skin:  Negative for rash.   Neurological:  Negative for weakness.   Hematological:  Does not bruise/bleed easily.   All other systems reviewed and are negative.    Physical Exam     Initial Vitals [01/30/24 1534]   BP Pulse Resp Temp SpO2   (!) 154/74 (!) 132 (!) 26 98.5 °F (36.9 °C) 95 %      MAP       --         Physical Exam    Nursing note and vitals reviewed.  Constitutional: He appears well-developed and well-nourished.   HENT:   Head: Normocephalic and atraumatic.   Eyes: Conjunctivae and EOM are normal. Pupils are equal, round, and reactive to light.   Neck:   Normal range of  motion.  Cardiovascular:  Normal heart sounds and intact distal pulses.   Tachycardia present.         Pulmonary/Chest: No accessory muscle usage. Tachypnea noted. He is in respiratory distress. He has wheezes in the right upper field, the right middle field, the right lower field, the left upper field and the left lower field. He exhibits no tenderness.   Coarse breath sounds with wheezing throughout all lung fields   Abdominal: Abdomen is soft. Bowel sounds are normal. He exhibits no distension and no mass. There is no abdominal tenderness. There is no rebound and no guarding.   Musculoskeletal:         General: No tenderness or edema. Normal range of motion.      Cervical back: Normal range of motion.     Neurological: He is alert and oriented to person, place, and time. He has normal strength. GCS score is 15. GCS eye subscore is 4. GCS verbal subscore is 5. GCS motor subscore is 6.   Skin: Skin is warm. Capillary refill takes less than 2 seconds.       ED Course   Procedures  Labs Reviewed   CBC W/ AUTO DIFFERENTIAL - Abnormal; Notable for the following components:       Result Value    WBC 13.54 (*)     Gran # (ANC) 9.7 (*)     Immature Grans (Abs) 0.05 (*)     Mono # 1.1 (*)     Lymph % 17.9 (*)     All other components within normal limits   COMPREHENSIVE METABOLIC PANEL - Abnormal; Notable for the following components:    AST 55 (*)     ALT 61 (*)     Anion Gap 7 (*)     All other components within normal limits   INFLUENZA A & B BY MOLECULAR   SARS-COV-2 RNA AMPLIFICATION, QUAL    Narrative:     Is the patient symptomatic?->Yes   TROPONIN I   NT-PRO NATRIURETIC PEPTIDE   D DIMER, QUANTITATIVE     EKG Readings: (Independently Interpreted)   Initial Reading: No STEMI. Rhythm: Sinus Tachycardia. Ectopy: No Ectopy. Conduction: Normal. Clinical Impression: Sinus Tachycardia       Imaging Results              X-Ray Chest AP Portable (Final result)  Result time 01/30/24 17:38:24      Final result by Elisabeth  MD Sammy (01/30/24 17:38:24)                   Impression:      No acute abnormality.      Electronically signed by: Sammy Barber  Date:    01/30/2024  Time:    17:38               Narrative:    EXAMINATION:  XR CHEST AP PORTABLE    CLINICAL HISTORY:  Wheezing    TECHNIQUE:  Single frontal view of the chest was performed.    COMPARISON:  None    FINDINGS:  The lungs are clear, with normal appearance of pulmonary vasculature and no pleural effusion or pneumothorax.    The cardiac silhouette is normal in size. The hilar and mediastinal contours are unremarkable.    Bones are intact.                                       Medications   levalbuterol nebulizer solution 1.25 mg (1.25 mg Nebulization Given 1/30/24 1644)   acetaminophen tablet 1,000 mg (1,000 mg Oral Given 1/30/24 1628)   predniSONE tablet 60 mg (60 mg Oral Given 1/30/24 1627)   albuterol-ipratropium 2.5 mg-0.5 mg/3 mL nebulizer solution 3 mL (3 mLs Nebulization Given 1/30/24 1748)   dexAMETHasone injection 8 mg (8 mg Intravenous Given 1/30/24 1836)     Medical Decision Making  17 y/o male which presents to the ED with SOB, chest pain, and wheezing since yesterday. Pt in mild respiratory distress upon presentation. 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  Levalbuterol given x 3 along with oral prednisone upon presentation but the patient did not have improvement. Labs unremarkable except for slightly elevated WBC. Pt then given duoneb without improvement. CXR without pneumonia but there is poor inspiratory effort on independent interpretation. Continued to not have improvement of wheezing/coarse breath sounds have 2nd round of nebulizers so patient was given 8 mg IV decadron. Pt was noted to have desaturation to 88% while talking and was placed on oxygen during ED course. Pt will be placed in observation for pulmonology consult and continued treatments/steroids. Pt agrees to admission. Discussed ED course of patient and need for  admission with [facility] resident who agrees to accepting the patient.     Problems Addressed:  Moderate asthma with exacerbation, unspecified whether persistent: acute illness or injury  SOB (shortness of breath): acute illness or injury  Wheezing: acute illness or injury    Amount and/or Complexity of Data Reviewed  Labs: ordered. Decision-making details documented in ED Course.  Radiology: ordered. Decision-making details documented in ED Course.    Risk  OTC drugs.  Prescription drug management.              Attending Attestation:     Physician Attestation Statement for NP/PA:   I personally made/approved the management plan and take responsibility for the patient management.    Other NP/PA Attestation Additions:      Medical Decision Making: Agree with assessment and plan.             ED Course as of 02/03/24 0608   Tue Jan 30, 2024   1535 BP(!): 154/74 [AT]   1535 Temp: 98.5 °F (36.9 °C) [AT]   1535 Pulse(!): 132 [AT]   1535 Resp(!): 26 [AT]   1535 SpO2: 95 % [AT]   1708 Influenza A, Molecular: Negative [AT]   1708 Influenza B, Molecular: Negative [AT]   1708 SARS-CoV-2 RNA, Amplification, Qual: Negative [AT]   1726 Pt continues to have significant wheezing. Labs, CXR, EKG, and another set of nebulizers ordered.  [AT]   1739 X-Ray Chest AP Portable  Poor inspiration- appears to have edema by independent interpretation [AT]   1809 WBC(!): 13.54 [AT]   1810 NT-proBNP: <20 [AT]   1820 D-Dimer: 0.36 [AT]   1820 Troponin I: 0.013 [AT]   1831 Pt continues to have significant wheezing and will be admitted for acute bronchitis/asthma exacerbation. Maintaining O2 saturation at rest.  [AT]   1949 SpO2: 97 % [AT]      ED Course User Index  [AT] Kaleigh Grossman FNP                           Clinical Impression:  Final diagnoses:  [R06.2] Wheezing  [R06.02] SOB (shortness of breath)  [J45.901] Moderate asthma with exacerbation, unspecified whether persistent (Primary)          ED Disposition Condition    Observation Stable                 Kaleigh Grossman, SAMANTHA  01/30/24 1905       Asim Hankins MD  02/03/24 0638

## 2024-02-01 NOTE — NURSING
Pt meets criteria for discharge. VSS. Tolerating diabetic diet. Voiding spontaneously. AVS given to pt, states understanding of discharge instructions. IV removed. Pt to be discharged to home.

## 2024-02-02 NOTE — DISCHARGE SUMMARY
[facility] Medicine Discharge Summary    Primary Team: [facility] Hospitalist Team A  Attending Physician: Dr. Abdul  Resident: Jessica  Intern: Jimbo    Date of Admit: 1/30/2024  Date of Discharge: 1/31/2024    Discharge to: home  Condition: stable    Discharge Diagnoses     Patient Active Problem List   Diagnosis    Reactive airway disease with acute exacerbation    Transaminitis    Eczema    Childhood asthma    Hyperglycemia       Consultants and Procedures     Consultants:  GINA    Procedures:   NA    Imaging:  CXR    Brief History of Present Illness      Mike Aguillon is a 18 y.o. male with past medical history of childhood asthma who presented to [facility] ED on 1/30/2024 for acute onset shortness of breath, congestion, and fever/chills in the setting of grain exposure at work. He was transferred to [facility] for inpatient management of suspected asthma exacerbation. Patient is admitted to [facility] Medicine.     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 He reports he was not given a respirator on his first day, as he should have been, and within 4 hours of working, he began to have a runny nose and cough. By the time patient returned home that evening, he reports significant shortness of breath and difficulty breathing. He also reports subjective fever/chills, general malaise and myalgias. He returned to work the next day but after several hours of working his symptoms became worse and he went to the ED on request of his employers.      Patient reports a childhood history of asthma, for which he used PRN albuterol, however reports he has never had an exacerbation requiring prednisone, has never been hospitalized/intubated, and has not used an inhaler in years. He also states he has eczema, primarily affecting his axilla, which he feels is triggered by certain deodorants. He denies  history of seasonal allergies or antihistamine use.    Patient had drastic improvement and resolution of symptoms with steroids and duonebs. Patient was discharged on short course steroids, rescue inhaler, flonase, and ambulatory referrals for PCP, A/I, and pulmonology.    For the full HPI please refer to the History & Physical from this admission.    Hospital Course By Problem with Pertinent Findings     Acute Onset Shortness of Breath   Reactive airway disease   Pt w childhood hx of asthma/eczema w acute onset SOB following grain exposure. Assoc sx include congestion, fever/chills. Hemodynamically stable, with good airway protection and air movement. No n/v/d, no trouble swallowing.   - No wheezing on my exam however reportedly audibly wheezing in the ED  - CXR without consolidations or effusions  - Flu/COVID negative  - s/p duonebs, decadron 8mg, prednisone 60mg, levalbuterol and duonebs in the ED  - Continuous duonebs Q6h  - Continued prednisone 40mg for component of reactive airway disease/asthma, discharged with steroid taper x 5 days total   - Started cetirizine for possible allergic component  - ambulatory referral to pulmonology and allergy/immunology on discharge  - steroid taper, flonase and albuterol rescue inhaler delivered at bedside prior to discharge      Hyperglycemia  - Could be secondary to prednisone administration  - A1c 5.9, prediabetes, to be followed up with PCP     Childhood Hx Asthma  - No longer uses inhalers, previously used PRN albuterol  - Denies history of hospitalizations or intubation  - As above     Eczema  - No active lesions on exam  - Moisture/barrier cream PRN     Healthcare maintenance   -ambulatory referral to IM given    Discharge Medications        Medication List        START taking these medications      fluticasone propionate 50 mcg/actuation nasal spray  Commonly known as: FLONASE  1 spray (50 mcg total) by Each Nostril route once daily. for 5 days     predniSONE 50 MG  Tab  Commonly known as: DELTASONE  Take 1 tablet (50 mg total) by mouth once daily. for 4 days     VENTOLIN HFA 90 mcg/actuation inhaler  Generic drug: albuterol  Inhale 2 puffs into the lungs every 4 (four) hours as needed for Wheezing or Shortness of Breath.            STOP taking these medications      dicyclomine 20 mg tablet  Commonly known as: BENTYL     famotidine 20 MG tablet  Commonly known as: PEPCID     ondansetron 4 MG Tbdl  Commonly known as: ZOFRAN-ODT               Where to Get Your Medications        These medications were sent to [facility]  [address], [place] [address]      Hours: Mon-Fri, 8a-5:30p Phone: [phone]   fluticasone propionate 50 mcg/actuation nasal spray  predniSONE 50 MG Tab  VENTOLIN HFA 90 mcg/actuation inhaler         Discharge Information:   Diet:  Regular    Physical Activity:  As tolerated             Instructions:  1. Take all medications as prescribed  2. Keep all follow-up appointments  3. Return to the hospital or call your primary care physicians if any worsening symptoms such as fever, chest pain, shortness of breath, return of symptoms, or any other concerns.    Follow-Up Appointments:  Primary care physician - ambulatory referral given, please schedule appointment  Allergy and Immunology - ambulatory referral given, please schedule appointment  Pulmonology - ambulatory referral given, please schedule appointment     Bushra Choi DO  [facility] Internal Medicine/Emergency Medicine [place]

## 2024-02-08 ENCOUNTER — TELEPHONE (OUTPATIENT)
Dept: ADMINISTRATIVE | Facility: HOSPITAL | Age: 19
End: 2024-02-08
Payer: MEDICAID

## 2024-02-09 ENCOUNTER — TELEPHONE (OUTPATIENT)
Dept: ADMINISTRATIVE | Facility: HOSPITAL | Age: 19
End: 2024-02-09
Payer: MEDICAID

## 2025-04-11 ENCOUNTER — HOSPITAL ENCOUNTER (EMERGENCY)
Facility: HOSPITAL | Age: 20
Discharge: HOME | End: 2025-04-11
Attending: STUDENT IN AN ORGANIZED HEALTH CARE EDUCATION/TRAINING PROGRAM
Payer: MEDICAID

## 2025-04-11 VITALS
TEMPERATURE: 98 F | BODY MASS INDEX: 35.19 KG/M2 | DIASTOLIC BLOOD PRESSURE: 75 MMHG | SYSTOLIC BLOOD PRESSURE: 141 MMHG | WEIGHT: 283 LBS | HEART RATE: 90 BPM | HEIGHT: 75 IN | OXYGEN SATURATION: 99 % | RESPIRATION RATE: 16 BRPM

## 2025-04-11 DIAGNOSIS — M25.539 WRIST PAIN: ICD-10-CM

## 2025-04-11 DIAGNOSIS — Z87.81 WRIST PAIN WITH OLD SCAPHOID FRACTURE DETERMINED BY X-RAY: Primary | ICD-10-CM

## 2025-04-11 DIAGNOSIS — M25.539 WRIST PAIN WITH OLD SCAPHOID FRACTURE DETERMINED BY X-RAY: Primary | ICD-10-CM

## 2025-04-11 PROCEDURE — 29125 APPL SHORT ARM SPLINT STATIC: CPT | Mod: RT,ER

## 2025-04-11 PROCEDURE — 99283 EMERGENCY DEPT VISIT LOW MDM: CPT | Mod: 25,ER

## 2025-04-11 PROCEDURE — 25000003 PHARM REV CODE 250: Mod: ER | Performed by: NURSE PRACTITIONER

## 2025-04-11 RX ORDER — NAPROXEN 500 MG/1
500 TABLET ORAL 2 TIMES DAILY WITH MEALS
Qty: 30 TABLET | Refills: 0 | Status: SHIPPED | OUTPATIENT
Start: 2025-04-11

## 2025-04-11 RX ORDER — IBUPROFEN 600 MG/1
600 TABLET ORAL
Status: COMPLETED | OUTPATIENT
Start: 2025-04-11 | End: 2025-04-11

## 2025-04-11 RX ADMIN — IBUPROFEN 600 MG: 600 TABLET, FILM COATED ORAL at 05:04

## 2025-04-11 NOTE — ED PROVIDER NOTES
"Encounter Date: 4/11/2025       History     Chief Complaint   Patient presents with    Wrist Pain     Pt is complaining of right wrist pain. Pt states he was lifting a mirror and his wrist started to hurt. Previous sx with 2 screws placed.      19-year-old male with history of right scaphoid fracture presents to ED with concern exacerbation of his chronic right hand pain.  Patient believes fracture/surgery occurred in 2020.  He reports since surgery he continues have an intermittent "shooting" pain that occurs with excessive activities or movements.  He states his pain exacerbated yesterday after lifting on object with pain described as sharp, worse with touch or movement, severity 8/10.  Denying any specific injury, trauma or fall.  No numbness or focal weakness.  No other acute complaints at this time    The history is provided by the patient.     Review of patient's allergies indicates:  No Known Allergies  Past Medical History:   Diagnosis Date    Asthma     Eczema      History reviewed. No pertinent surgical history.  No family history on file.  Social History[1]  Review of Systems   Constitutional:  Negative for chills and fever.   Musculoskeletal:  Positive for arthralgias.   Skin:  Negative for wound.   Neurological:  Negative for weakness and numbness.       Physical Exam     Initial Vitals [04/11/25 1727]   BP Pulse Resp Temp SpO2   (!) 148/81 88 16 98 °F (36.7 °C) 96 %      MAP       --         Physical Exam    Vitals reviewed.  Constitutional: He appears well-developed and well-nourished. He is active. He does not have a sickly appearance. He does not appear ill. No distress.   HENT:   Head: Normocephalic and atraumatic.   Neck:   Normal range of motion.  Musculoskeletal:      Cervical back: Normal range of motion.      Comments: Generalized tenderness reported to radial aspect of right wrist with tenderness extending to right hand.  No palpable deformities.  Previous incision well healed.  No acute skin " changes.  ROM of fingers and wrist are intact.  Sensations intact.  Radial pulse intact.     Neurological: He is alert. GCS eye subscore is 4. GCS verbal subscore is 5. GCS motor subscore is 6.   Skin: Skin is warm and dry.   Psychiatric: He has a normal mood and affect. His speech is normal and behavior is normal.         ED Course   Procedures  Labs Reviewed - No data to display       Imaging Results              X-Ray Hand 3 view Right (Final result)  Result time 04/11/25 18:49:33      Final result by Serena Hannah MD (04/11/25 18:49:33)                   Impression:       No acute findings are identified.    Finalized on: 4/11/2025 6:49 PM By:  Serena Hannah MD  [facility]# [id number]      2025-04-11 18:51:[id number]     [facility]               Narrative:    EXAM: XR HAND COMPLETE 3 VIEW RIGHT    CLINICAL INDICATION:  Pain    PRIORS:   None    FINDINGS:  No acute fracture dislocation identified.  No aggressive bony finding.  Joint spaces are maintained.                                         X-Ray Wrist Complete Right (Final result)  Result time 04/11/25 18:47:15      Final result by Serena Hannah MD (04/11/25 18:47:15)                   Impression:       No acute findings are identified.    Finalized on: 4/11/2025 6:47 PM By:  Serena Hannah MD  [facility]# [id number]      2025-04-11 18:49:19.263     [facility]               Narrative:    EXAM: XR WRIST COMPLETE 3 VIEWS RIGHT    CLINICAL INDICATION:  Pain    PRIORS:   None    FINDINGS:  No acute fracture or dislocation seen.  Internal screw hardware in the scaphoid without atypia.  No aggressive bony finding.                                         Medications   ibuprofen tablet 600 mg (600 mg Oral Given 4/11/25 1750)     Medical Decision Making  Patient reports concern for generalized right hand and wrist pain.  History of chronic hand pain s/p scaphoid fracture/surgery in 2020.  Denying any trauma or fall.  Afebrile.  Patient in no distress on exam    DDx:  Including but  not limited to chronic pain, strain, sprain, contusion, dislocation, fracture, hardware malfunction    Amount and/or Complexity of Data Reviewed  Radiology: ordered. Decision-making details documented in ED Course.    Risk  Prescription drug management.               ED Course as of 04/11/25 1907 Fri Apr 11, 2025 1834 X-Ray Hand 3 view Right  X-ray right hand per my interpretation noting hardware to scaphoid but no acute bony abnormalities, displaced fracture or dislocation visualized.  Pending Radiology review [KS]   1835 X-Ray Wrist Complete Right  X-ray right wrist per my interpretation noting hardware to scaphoid but with no acute fracture or dislocation visualized.  Pending Radiology review [KS]   1902 Results were discussed with patient.  Will plan to continue with supportive care.  Focal wrist splint was applied in ED. ambulatory referral sent to Orthopedics as request.  Prescription written for naproxen.  Encouraged activities and movements as tolerated with outpatient follow-up.  ED return precautions were discussed.  Patient states understanding and agrees with plan [KS]      ED Course User Index  [KS] Chandu Jones PA-C                           Clinical Impression:  Final diagnoses:  [M25.539] Wrist pain  [M25.539, Z87.81] Wrist pain with old scaphoid fracture determined by x-ray (Primary)          ED Disposition Condition    Discharge Stable          ED Prescriptions       Medication Sig Dispense Start Date End Date Auth. Provider    naproxen (NAPROSYN) 500 MG tablet Take 1 tablet (500 mg total) by mouth 2 (two) times daily with meals. 30 tablet 4/11/2025 -- Chandu Jones PA-C          Follow-up Information       Follow up With Specialties Details Why Contact Info    Your Doctor                   [1]   Social History  Tobacco Use    Smoking status: Never    Smokeless tobacco: Never   Substance Use Topics    Alcohol use: Never    Drug use: Never        Chandu Jones PA-C  04/11/25 1907        Chandu Jones PA-C  04/11/25 1917

## 2025-04-11 NOTE — FIRST PROVIDER EVALUATION
Emergency Department TeleTriage Encounter Note      CHIEF COMPLAINT    Chief Complaint   Patient presents with    Wrist Pain     Pt is complaining of right wrist pain. Pt states he was lifting a mirror and his wrist started to hurt. Previous sx with 2 screws placed.        VITAL SIGNS   Initial Vitals [04/11/25 1727]   BP Pulse Resp Temp SpO2   (!) 148/81 88 16 98 °F (36.7 °C) 96 %      MAP       --            ALLERGIES    Review of patient's allergies indicates:  No Known Allergies    PROVIDER TRIAGE NOTE  This is a teletriage evaluation of a 19 y.o. male presenting to the ED complaining of wrist pain after lifting a mirror today. States he has pmhx of internal hardware.    Alert, no distress.     Initial orders will be placed and care will be transferred to an alternate provider when patient is roomed for a full evaluation. Any additional orders and the final disposition will be determined by that provider.         ORDERS  Labs Reviewed - No data to display    ED Orders (720h ago, onward)      Start Ordered     Status Ordering Provider    04/11/25 1745 04/11/25 1734  ibuprofen tablet 600 mg  ED 1 Time         Ordered DAVID ROSS    04/11/25 1735 04/11/25 1734  X-Ray Wrist Complete Right  1 time imaging         Ordered DAVID ROSS              Virtual Visit Note: The provider triage portion of this emergency department evaluation and documentation was performed via [employer], a HIPAA-compliant telemedicine application, in concert with a tele-presenter in the room. A face to face patient evaluation with one of my colleagues will occur once the patient is placed in an emergency department room.      DISCLAIMER: This note was prepared with [employer] voice recognition transcription software. Garbled syntax, mangled pronouns, and other bizarre constructions may be attributed to that software system.

## 2025-04-12 NOTE — DISCHARGE INSTRUCTIONS
